# Patient Record
(demographics unavailable — no encounter records)

---

## 2025-01-30 NOTE — DATA REVIEWED
[FreeTextEntry1] : Independent review of imaging and independent interpretation was performed at today's visit. -1/18/25 CT Abdomen and Pelvis at [facility] -1/19/25 EGD at [facility]

## 2025-01-30 NOTE — HISTORY OF PRESENT ILLNESS
[FreeTextEntry1] : Ms. HERNANDEZ is a 75-year-old female who presents for follow up regarding GERD, Hiatal hernia.    Past Medical History is significant for HTN, DM, Neuropathy, Asthma, HLD,   1/19/25-1/24/25 [facility] Admission -Patient initially presented to ER with complaints of epigastric pain diffuse constant abdominal pain associated with nausea and vomiting. She underwent a CT abdomen and pelvis which revealed dilation of hiatal hernia with debris to the level of the aortic hiatus. The esophagus proximal to this is fluid-filled. There is high density return to the gallbladder compatible with vicarious excretion of contrast. She has history of Hiatal hernia/GERD s/p hiatal hernia repair (03/2021 in [place]) s/p redo hiatal hernia repair (10/2022) with Dr. John Ferrer followed by balloon dilation of lower esophageal sphincter 2/27/23. She was admitted to CTICU, electively intubated and underwent EGD which demonstrated large food burden proximal to wrap. She also underwent ERCP with evidence of chronic stasis and large hernia sac, which was approximately 7cm in diameter filled with food debris, impacted, obstructing the gastric outlet. She was ultimately extubated without issue. Transferred to floor, tolerated sips of water and ice chips and advanced to full liquids on 1/23. Patient was discharged home 1/24 with outpatient follow up.   1/19/25 EGD at [facility] -There was evidence in the mid-distal esophagus consistent with chronic stasis, there was a large hernia sac which was approximately 7 cm in diameter filled with food debris, impacted, obstructing the outlet to the gastric lumen, utilizing multiple devices enough of the material was carefully removed in order to advance the endoscope into the lower portion of the stomach. Using a TTS balloon dilator the fundoplication was dilated serially to a maximal diameter of 1.5 cm with minimal superficial mucosal tearing, despite attempts the retained material could not be entirely removed. The decision was made to place an NGT tube, which was confirmed endoscopically to be within the hernia sac and possibly beyond, could not visualize whether NGT enters into stomach or looped within the hernia sac.  1/18/25 CT Abdomen and Pelvis at [facility] -Small to moderate sized hiatal hernia, which is distended with enteric debris to the level of the diaphragmatic hiatus.  -Abnormal endometrial thickening measuring up to 1.1cm in AP thickness -Questionable subtle 8mm mildly hyper enhancing lesion within the trial of the pancreas.

## 2025-02-13 NOTE — ASSESSMENT
[FreeTextEntry1] : Ms. HERNANDEZ is a 75-year-old female who presents for follow up regarding GERD, Hiatal hernia.    Past Medical History is significant for HTN, DM, Neuropathy, Asthma, HLD,   1/19/25-1/24/25 [facility] Admission -Patient initially presented to ER with complaints of epigastric pain diffuse constant abdominal pain associated with nausea and vomiting. She underwent a CT abdomen and pelvis which revealed dilation of hiatal hernia with debris to the level of the aortic hiatus. The esophagus proximal to this is fluid-filled. There is high density return to the gallbladder compatible with vicarious excretion of contrast. She has history of Hiatal hernia/GERD s/p hiatal hernia repair (03/2021 in [place]) s/p redo hiatal hernia repair (10/2022) with Dr. John Ferrer followed by balloon dilation of lower esophageal sphincter 2/27/23. She was admitted to CTICU, electively intubated and underwent EGD which demonstrated large food burden proximal to wrap. She also underwent ERCP with evidence of chronic stasis and large hernia sac, which was approximately 7cm in diameter filled with food debris, impacted, obstructing the gastric outlet. She was ultimately extubated without issue. Transferred to floor, tolerated sips of water and ice chips and advanced to full liquids on 1/23. Patient was discharged home 1/24 with outpatient follow up.   1/18/25 CT Abdomen and Pelvis at [facility] -Small to moderate sized hiatal hernia, which is distended with enteric debris to the level of the diaphragmatic hiatus.  -Abnormal endometrial thickening measuring up to 1.1cm in AP thickness -Questionable subtle 8mm mildly hyper enhancing lesion within the trial of the pancreas.   1/19/25 EGD at [facility] -There was evidence in the mid-distal esophagus consistent with chronic stasis, there was a large hernia sac which was approximately 7 cm in diameter filled with food debris, impacted, obstructing the outlet to the gastric lumen, utilizing multiple devices enough of the material was carefully removed in order to advance the endoscope into the lower portion of the stomach. Using a TTS balloon dilator the fundoplication was dilated serially to a maximal diameter of 1.5 cm with minimal superficial mucosal tearing, despite attempts the retained material could not be entirely removed. The decision was made to place an NGT tube, which was confirmed endoscopically to be within the hernia sac and possibly beyond, could not visualize whether NGT enters into stomach or looped within the hernia sac.  We discussed that she is not experiencing any concerning symptoms at this time including dysphagia, vomiting or abdominal discomfort. She is tolerating a regular diet. We discussed that she should continue a regular diet, to eat slow and chew her food well. I would like to closely monitor her symptoms; therefore, I am recommending she follow up in office for clinical evaluation in three months. All questions answered, patient verbalizes understanding and agrees to follow up accordingly.  PLAN:  -Return to office for clinical evaluation in 3 months or sooner if needed  I, Dr. Belcher personally performed the evaluation and management (E/M) services for this established patient who presents today with an existing condition(s). That E/M includes conducting the examination, assessing all new/exacerbated conditions, and establishing a new plan of care. Today, my ACP, Dot Dai NP, was here to observe my evaluation and management services for this existing condition to be followed going forward.

## 2025-02-13 NOTE — ASSESSMENT
[FreeTextEntry1] : Ms. HERNANDEZ is a 75-year-old female who presents for follow up regarding GERD, Hiatal hernia.    Past Medical History is significant for HTN, DM, Neuropathy, Asthma, HLD,   1/19/25-1/24/25 [facility] Admission -Patient initially presented to ER with complaints of epigastric pain diffuse constant abdominal pain associated with nausea and vomiting. She underwent a CT abdomen and pelvis which revealed dilation of hiatal hernia with debris to the level of the aortic hiatus. The esophagus proximal to this is fluid-filled. There is high density return to the gallbladder compatible with vicarious excretion of contrast. She has history of Hiatal hernia/GERD s/p hiatal hernia repair (03/2021 in [place]) s/p redo hiatal hernia repair (10/2022) with Dr. John Ferrer followed by balloon dilation of lower esophageal sphincter 2/27/23. She was admitted to CTICU, electively intubated and underwent EGD which demonstrated large food burden proximal to wrap. She also underwent ERCP with evidence of chronic stasis and large hernia sac, which was approximately 7cm in diameter filled with food debris, impacted, obstructing the gastric outlet. She was ultimately extubated without issue. Transferred to floor, tolerated sips of water and ice chips and advanced to full liquids on 1/23. Patient was discharged home 1/24 with outpatient follow up.   1/18/25 CT Abdomen and Pelvis at [facility] -Small to moderate sized hiatal hernia, which is distended with enteric debris to the level of the diaphragmatic hiatus.  -Abnormal endometrial thickening measuring up to 1.1cm in AP thickness -Questionable subtle 8mm mildly hyper enhancing lesion within the trial of the pancreas.   1/19/25 EGD at [facility] -There was evidence in the mid-distal esophagus consistent with chronic stasis, there was a large hernia sac which was approximately 7 cm in diameter filled with food debris, impacted, obstructing the outlet to the gastric lumen, utilizing multiple devices enough of the material was carefully removed in order to advance the endoscope into the lower portion of the stomach. Using a TTS balloon dilator the fundoplication was dilated serially to a maximal diameter of 1.5 cm with minimal superficial mucosal tearing, despite attempts the retained material could not be entirely removed. The decision was made to place an NGT tube, which was confirmed endoscopically to be within the hernia sac and possibly beyond, could not visualize whether NGT enters into stomach or looped within the hernia sac.  We discussed that she is not experiencing any concerning symptoms at this time including dysphagia, vomiting or abdominal discomfort. She is tolerating a regular diet. We discussed that she should continue a regular diet, to eat slow and chew her food well. I would like to closely monitor her symptoms; therefore, I am recommending she follow up in office for clinical evaluation in three months. All questions answered, patient verbalizes understanding and agrees to follow up accordingly.  PLAN:  -Return to office for clinical evaluation in 3 months or sooner if needed  I, Dr. Belcher personally performed the evaluation and management (E/M) services for this established patient who presents today with an existing condition(s). That E/M includes conducting the examination, assessing all new/exacerbated conditions, and establishing a new plan of care. Today, my ACP, Dot Dai NP, was here to observe my evaluation and management services for this existing condition to be followed going forward.

## 2025-02-13 NOTE — PHYSICAL EXAM
[Sclera] : the sclera and conjunctiva were normal [Neck Appearance] : the appearance of the neck was normal [Respiration, Rhythm And Depth] : normal respiratory rhythm and effort [Auscultation Breath Sounds / Voice Sounds] : lungs were clear to auscultation bilaterally [Heart Rate And Rhythm] : heart rate was normal and rhythm regular [Heart Sounds] : normal S1 and S2 [Examination Of The Chest] : the chest was normal in appearance [Bowel Sounds] : normal bowel sounds [Abdomen Tenderness] : non-tender [Abnormal Walk] : normal gait [Skin Color & Pigmentation] : normal skin color and pigmentation [No Focal Deficits] : no focal deficits [Oriented To Time, Place, And Person] : oriented to person, place, and time [Impaired Insight] : insight and judgment were intact

## 2025-02-13 NOTE — DATA REVIEWED
[FreeTextEntry1] : Independent review of imaging and independent interpretation was performed at today's visit. -1/18/25 CT Abdomen and Pelvis at [facility] -1/19/25 EGD at [facility]

## 2025-02-13 NOTE — REVIEW OF SYSTEMS
[Negative] : Heme/Lymph [Fever] : no fever [Chills] : no chills [Feeling Poorly] : not feeling poorly [Feeling Tired] : not feeling tired [Chest Pain] : no chest pain [Shortness Of Breath] : no shortness of breath [Cough] : no cough [SOB on Exertion] : no shortness of breath during exertion [FreeTextEntry7] : intermittent nausea

## 2025-02-13 NOTE — HISTORY OF PRESENT ILLNESS
[FreeTextEntry1] : Ms. HERNANDEZ is a 75-year-old female who presents for follow up regarding GERD, Hiatal hernia.    Past Medical History is significant for HTN, DM, Neuropathy, Asthma, HLD,   1/19/25-1/24/25 [facility] Admission -Patient initially presented to ER with complaints of epigastric pain diffuse constant abdominal pain associated with nausea and vomiting. She underwent a CT abdomen and pelvis which revealed dilation of hiatal hernia with debris to the level of the aortic hiatus. The esophagus proximal to this is fluid-filled. There is high density return to the gallbladder compatible with vicarious excretion of contrast. She has history of Hiatal hernia/GERD s/p hiatal hernia repair (03/2021 in [place]) s/p redo hiatal hernia repair (10/2022) with Dr. John Ferrer followed by balloon dilation of lower esophageal sphincter 2/27/23. She was admitted to CTICU, electively intubated and underwent EGD which demonstrated large food burden proximal to wrap. She also underwent ERCP with evidence of chronic stasis and large hernia sac, which was approximately 7cm in diameter filled with food debris, impacted, obstructing the gastric outlet. She was ultimately extubated without issue. Transferred to floor, tolerated sips of water and ice chips and advanced to full liquids on 1/23. Patient was discharged home 1/24 with outpatient follow up.   1/18/25 CT Abdomen and Pelvis at [facility] -Small to moderate sized hiatal hernia, which is distended with enteric debris to the level of the diaphragmatic hiatus.  -Abnormal endometrial thickening measuring up to 1.1cm in AP thickness -Questionable subtle 8mm mildly hyper enhancing lesion within the trial of the pancreas.   1/19/25 EGD at [facility] -There was evidence in the mid-distal esophagus consistent with chronic stasis, there was a large hernia sac which was approximately 7 cm in diameter filled with food debris, impacted, obstructing the outlet to the gastric lumen, utilizing multiple devices enough of the material was carefully removed in order to advance the endoscope into the lower portion of the stomach. Using a TTS balloon dilator the fundoplication was dilated serially to a maximal diameter of 1.5 cm with minimal superficial mucosal tearing, despite attempts the retained material could not be entirely removed. The decision was made to place an NGT tube, which was confirmed endoscopically to be within the hernia sac and possibly beyond, could not visualize whether NGT enters into stomach or looped within the hernia sac.  Today, she is accompanied by her daughter who assists in the history taking. She reports feeling overall well today. She reports mild intermittent nausea.  She is eating well, denies dysphagia or vomiting.

## 2025-02-13 NOTE — DATA REVIEWED
[FreeTextEntry1] : Independent review of imaging and independent interpretation was performed at today's visit. -1/18/25 CT Abdomen and Pelvis at [facility] -1/19/25 EGD at [facility]

## 2025-02-13 NOTE — HISTORY OF PRESENT ILLNESS
[FreeTextEntry1] : Ms. HERNANDEZ is a 75-year-old female who presents for follow up regarding GERD, Hiatal hernia.    Past Medical History is significant for HTN, DM, Neuropathy, Asthma, HLD,   1/19/25-1/24/25 [facility] Admission -Patient initially presented to ER with complaints of epigastric pain diffuse constant abdominal pain associated with nausea and vomiting. She underwent a CT abdomen and pelvis which revealed dilation of hiatal hernia with debris to the level of the aortic hiatus. The esophagus proximal to this is fluid-filled. There is high density return to the gallbladder compatible with vicarious excretion of contrast. She has history of Hiatal hernia/GERD s/p hiatal hernia repair (03/2021 in [place]) s/p redo hiatal hernia repair (10/2022) with Dr. John Ferrer followed by balloon dilation of lower esophageal sphincter 2/27/23. She was admitted to CTICU, electively intubated and underwent EGD which demonstrated large food burden proximal to wrap. She also underwent ERCP with evidence of chronic stasis and large hernia sac, which was approximately 7cm in diameter filled with food debris, impacted, obstructing the gastric outlet. She was ultimately extubated without issue. Transferred to floor, tolerated sips of water and ice chips and advanced to full liquids on 1/23. Patient was discharged home 1/24 with outpatient follow up.   1/18/25 CT Abdomen and Pelvis at [facility] -Small to moderate sized hiatal hernia, which is distended with enteric debris to the level of the diaphragmatic hiatus.  -Abnormal endometrial thickening measuring up to 1.1cm in AP thickness -Questionable subtle 8mm mildly hyper enhancing lesion within the trial of the pancreas.   1/19/25 EGD at [facility] -There was evidence in the mid-distal esophagus consistent with chronic stasis, there was a large hernia sac which was approximately 7 cm in diameter filled with food debris, impacted, obstructing the outlet to the gastric lumen, utilizing multiple devices enough of the material was carefully removed in order to advance the endoscope into the lower portion of the stomach. Using a TTS balloon dilator the fundoplication was dilated serially to a maximal diameter of 1.5 cm with minimal superficial mucosal tearing, despite attempts the retained material could not be entirely removed. The decision was made to place an NGT tube, which was confirmed endoscopically to be within the hernia sac and possibly beyond, could not visualize whether NGT enters into stomach or looped within the hernia sac.  Today, she is accompanied by her daughter who assists in the history taking. She reports feeling overall well today. She reports mild intermittent nausea.  She is eating well, denies dysphagia or vomiting.

## 2025-04-29 NOTE — HISTORY OF PRESENT ILLNESS
[FreeTextEntry1] : Ms. HERNANDEZ is a 75-year-old female who presents for follow up regarding GERD, Hiatal hernia. At our last visit on 2/13/25, it is recommended she return to office for clinical evaluation in 3 months.   Past Medical History is significant for HTN, DM, Neuropathy, Asthma, HLD,  1/19/25-1/24/25 [facility] Admission -Patient initially presented to ER with complaints of epigastric pain diffuse constant abdominal pain associated with nausea and vomiting. She underwent a CT abdomen and pelvis which revealed dilation of hiatal hernia with debris to the level of the aortic hiatus. The esophagus proximal to this is fluid-filled. There is high density return to the gallbladder compatible with vicarious excretion of contrast. She has history of Hiatal hernia/GERD s/p hiatal hernia repair (03/2021 in [place]) s/p redo hiatal hernia repair (10/2022) with Dr. John Ferrer followed by balloon dilation of lower esophageal sphincter 2/27/23. She was admitted to CTICU, electively intubated and underwent EGD which demonstrated large food burden proximal to wrap. She also underwent ERCP with evidence of chronic stasis and large hernia sac, which was approximately 7cm in diameter filled with food debris, impacted, obstructing the gastric outlet. She was ultimately extubated without issue. Transferred to floor, tolerated sips of water and ice chips and advanced to full liquids on 1/23. Patient was discharged home 1/24 with outpatient follow up.  1/18/25 CT Abdomen and Pelvis at [facility] -Small to moderate sized hiatal hernia, which is distended with enteric debris to the level of the diaphragmatic hiatus. -Abnormal endometrial thickening measuring up to 1.1cm in AP thickness -Questionable subtle 8mm mildly hyper enhancing lesion within the trial of the pancreas.  1/19/25 EGD at [facility] -There was evidence in the mid-distal esophagus consistent with chronic stasis, there was a large hernia sac which was approximately 7 cm in diameter filled with food debris, impacted, obstructing the outlet to the gastric lumen, utilizing multiple devices enough of the material was carefully removed in order to advance the endoscope into the lower portion of the stomach. Using a TTS balloon dilator the fundoplication was dilated serially to a maximal diameter of 1.5 cm with minimal superficial mucosal tearing, despite attempts the retained material could not be entirely removed. The decision was made to place an NGT tube, which was confirmed endoscopically to be within the hernia sac and possibly beyond, could not visualize whether NGT enters into stomach or looped within the hernia sac  2/13/25 Office visit: Recommended clinical evaluation in 3 months

## 2025-04-29 NOTE — ASSESSMENT
[FreeTextEntry1] : Ms. HERNANDEZ is a 75-year-old female who presents for follow up regarding GERD, Hiatal hernia. At our last visit on 2/13/25, it is recommended she return to office for clinical evaluation in 3 months.   Past Medical History is significant for HTN, DM, Neuropathy, Asthma, HLD  2/13/25 Office visit: Patient was feeling well, no complaints of dysphagia, vomiting or abdominal discomfort. Recommended to continue regular diet as tolerated and to return to office for clinical evaluation in 3 months   I have reviewed the patient's medical records and diagnostic images at time of this consultation and have made the following recommendations: 1. 2.  All questions answered, patient verbalizes understanding and agrees to follow up accordingly.

## 2025-05-01 NOTE — HISTORY OF PRESENT ILLNESS
[FreeTextEntry1] : Ms. HERNANDEZ is a 75-year-old female who presents for follow up regarding GERD, Hiatal hernia. At our last visit on 2/13/25, it is recommended she return to office for clinical evaluation in 3 months.   Past Medical History is significant for HTN, DM, Neuropathy, Asthma, HLD,  1/19/25-1/24/25 [facility] Admission -Patient initially presented to ER with complaints of epigastric pain diffuse constant abdominal pain associated with nausea and vomiting. She underwent a CT abdomen and pelvis which revealed dilation of hiatal hernia with debris to the level of the aortic hiatus. The esophagus proximal to this is fluid-filled. There is high density return to the gallbladder compatible with vicarious excretion of contrast. She has history of Hiatal hernia/GERD s/p hiatal hernia repair (03/2021 in [place]) s/p redo hiatal hernia repair (10/2022) with Dr. John Ferrer followed by balloon dilation of lower esophageal sphincter 2/27/23. She was admitted to CTICU, electively intubated and underwent EGD which demonstrated large food burden proximal to wrap. She also underwent ERCP with evidence of chronic stasis and large hernia sac, which was approximately 7cm in diameter filled with food debris, impacted, obstructing the gastric outlet. She was ultimately extubated without issue. Transferred to floor, tolerated sips of water and ice chips and advanced to full liquids on 1/23. Patient was discharged home 1/24 with outpatient follow up.  1/18/25 CT Abdomen and Pelvis at [facility] -Small to moderate sized hiatal hernia, which is distended with enteric debris to the level of the diaphragmatic hiatus. -Abnormal endometrial thickening measuring up to 1.1cm in AP thickness -Questionable subtle 8mm mildly hyper enhancing lesion within the trial of the pancreas.  1/19/25 EGD at [facility] -There was evidence in the mid-distal esophagus consistent with chronic stasis, there was a large hernia sac which was approximately 7 cm in diameter filled with food debris, impacted, obstructing the outlet to the gastric lumen, utilizing multiple devices enough of the material was carefully removed in order to advance the endoscope into the lower portion of the stomach. Using a TTS balloon dilator the fundoplication was dilated serially to a maximal diameter of 1.5 cm with minimal superficial mucosal tearing, despite attempts the retained material could not be entirely removed. The decision was made to place an NGT tube, which was confirmed endoscopically to be within the hernia sac and possibly beyond, could not visualize whether NGT enters into stomach or looped within the hernia sac  2/13/25 Office visit: Recommended clinical evaluation in 3 months

## 2025-06-13 NOTE — ASSESSMENT
[FreeTextEntry1] : Ms. ATKINS is a 75-year-old female who presents for follow up regarding GERD, Hiatal hernia. She has history of Hiatal hernia/GERD s/p hiatal hernia repair (03/2021 in [place]) s/p redo hiatal hernia repair (10/2022) with Dr. John Ferrer followed by balloon dilation of lower esophageal sphincter 2/27/23. She was admitted to CTICU, electively intubated and underwent EGD which demonstrated large food burden proximal to wrap. She also underwent ERCP with evidence of chronic stasis and large hernia sac, which was approximately 7cm in diameter filled with food debris, impacted, obstructing the gastric outlet in January 2025. At our last visit on 2/13/25, it is recommended she return to office for clinical evaluation in 3 months.   Past Medical History is significant for HTN, DM, Neuropathy, Asthma, HLD.  2/13/25 Office visit: Patient was feeling well, no complaints of dysphagia, vomiting or abdominal discomfort. Recommended to continue regular diet as tolerated and to return to office for clinical evaluation in 3 months   Overall, Ms. Atkins is doing well. I am encouraged by her progress. No further intervention is warranted at this time. The patient may return to care as needed.  I have reviewed the patient's medical records and diagnostic images at time of this consultation and have made the following recommendations: 1. Return to care as needed.  All questions answered, patient verbalizes understanding and agrees to follow up accordingly.  I, Dr. Marko Belcher, personally performed the evaluation and management (E/M) services for this established patient who presents today with an existing condition.  That E/M includes conducting the examination, assessing all conditions, and establishing a new plan of care.  Today, Dawit Sheehan PA-C, was here to observe my evaluation and management services for this/these condition(s) to be followed going forward.

## 2025-06-13 NOTE — HISTORY OF PRESENT ILLNESS
[FreeTextEntry1] : Ms. HERNANDEZ is a 75-year-old female who presents for follow up regarding GERD, Hiatal hernia. At our last visit on 2/13/25, it is recommended she return to office for clinical evaluation in 3 months.   Past Medical History is significant for HTN, DM, Neuropathy, Asthma, HLD,  1/19/25-1/24/25 [facility] Admission -Patient initially presented to ER with complaints of epigastric pain diffuse constant abdominal pain associated with nausea and vomiting. She underwent a CT abdomen and pelvis which revealed dilation of hiatal hernia with debris to the level of the aortic hiatus. The esophagus proximal to this is fluid-filled. There is high density return to the gallbladder compatible with vicarious excretion of contrast. She has history of Hiatal hernia/GERD s/p hiatal hernia repair (03/2021 in [place]) s/p redo hiatal hernia repair (10/2022) with Dr. John Ferrer followed by balloon dilation of lower esophageal sphincter 2/27/23. She was admitted to CTICU, electively intubated and underwent EGD which demonstrated large food burden proximal to wrap. She also underwent ERCP with evidence of chronic stasis and large hernia sac, which was approximately 7cm in diameter filled with food debris, impacted, obstructing the gastric outlet. She was ultimately extubated without issue. Transferred to floor, tolerated sips of water and ice chips and advanced to full liquids on 1/23. Patient was discharged home 1/24 with outpatient follow up.  1/18/25 CT Abdomen and Pelvis at [facility] -Small to moderate sized hiatal hernia, which is distended with enteric debris to the level of the diaphragmatic hiatus. -Abnormal endometrial thickening measuring up to 1.1cm in AP thickness -Questionable subtle 8mm mildly hyper enhancing lesion within the trial of the pancreas.  1/19/25 EGD at [facility] -There was evidence in the mid-distal esophagus consistent with chronic stasis, there was a large hernia sac which was approximately 7 cm in diameter filled with food debris, impacted, obstructing the outlet to the gastric lumen, utilizing multiple devices enough of the material was carefully removed in order to advance the endoscope into the lower portion of the stomach. Using a TTS balloon dilator the fundoplication was dilated serially to a maximal diameter of 1.5 cm with minimal superficial mucosal tearing, despite attempts the retained material could not be entirely removed. The decision was made to place an NGT tube, which was confirmed endoscopically to be within the hernia sac and possibly beyond, could not visualize whether NGT enters into stomach or looped within the hernia sac  2/13/25 Office visit: Recommended clinical evaluation in 3 months   Today she reports feeling well. Patient denies chest pain, palpitations, shortness of breath, cough, fevers, chills, fatigue and unintentional weight loss or gain.

## 2025-06-13 NOTE — HISTORY OF PRESENT ILLNESS
[FreeTextEntry1] : Ms. HERNANDEZ is a 75-year-old female who presents for follow up regarding GERD, Hiatal hernia. At our last visit on 2/13/25, it is recommended she return to office for clinical evaluation in 3 months.   Past Medical History is significant for HTN, DM, Neuropathy, Asthma, HLD,  1/19/25-1/24/25 [facility] Admission -Patient initially presented to ER with complaints of epigastric pain diffuse constant abdominal pain associated with nausea and vomiting. She underwent a CT abdomen and pelvis which revealed dilation of hiatal hernia with debris to the level of the aortic hiatus. The esophagus proximal to this is fluid-filled. There is high density return to the gallbladder compatible with vicarious excretion of contrast. She has history of Hiatal hernia/GERD s/p hiatal hernia repair (03/2021 in [place]) s/p redo hiatal hernia repair (10/2022) with Dr. John Ferrer followed by balloon dilation of lower esophageal sphincter 2/27/23. She was admitted to CTICU, electively intubated and underwent EGD which demonstrated large food burden proximal to wrap. She also underwent ERCP with evidence of chronic stasis and large hernia sac, which was approximately 7cm in diameter filled with food debris, impacted, obstructing the gastric outlet. She was ultimately extubated without issue. Transferred to floor, tolerated sips of water and ice chips and advanced to full liquids on 1/23. Patient was discharged home 1/24 with outpatient follow up.  1/18/25 CT Abdomen and Pelvis at [facility] -Small to moderate sized hiatal hernia, which is distended with enteric debris to the level of the diaphragmatic hiatus. -Abnormal endometrial thickening measuring up to 1.1cm in AP thickness -Questionable subtle 8mm mildly hyper enhancing lesion within the trial of the pancreas.  1/19/25 EGD at [facility] -There was evidence in the mid-distal esophagus consistent with chronic stasis, there was a large hernia sac which was approximately 7 cm in diameter filled with food debris, impacted, obstructing the outlet to the gastric lumen, utilizing multiple devices enough of the material was carefully removed in order to advance the endoscope into the lower portion of the stomach. Using a TTS balloon dilator the fundoplication was dilated serially to a maximal diameter of 1.5 cm with minimal superficial mucosal tearing, despite attempts the retained material could not be entirely removed. The decision was made to place an NGT tube, which was confirmed endoscopically to be within the hernia sac and possibly beyond, could not visualize whether NGT enters into stomach or looped within the hernia sac  2/13/25 Office visit: Recommended clinical evaluation in 3 months   Today she reports feeling well. Patient denies chest pain, palpitations, shortness of breath, cough, fevers, chills, fatigue and unintentional weight loss or gain.

## 2025-06-13 NOTE — HISTORY OF PRESENT ILLNESS
[FreeTextEntry1] : Ms. HERNANDEZ is a 75-year-old female who presents for follow up regarding GERD, Hiatal hernia. At our last visit on 2/13/25, it is recommended she return to office for clinical evaluation in 3 months.   Past Medical History is significant for HTN, DM, Neuropathy, Asthma, HLD,  1/19/25-1/24/25 [facility] Admission -Patient initially presented to ER with complaints of epigastric pain diffuse constant abdominal pain associated with nausea and vomiting. She underwent a CT abdomen and pelvis which revealed dilation of hiatal hernia with debris to the level of the aortic hiatus. The esophagus proximal to this is fluid-filled. There is high density return to the gallbladder compatible with vicarious excretion of contrast. She has history of Hiatal hernia/GERD s/p hiatal hernia repair (03/2021 in [place]) s/p redo hiatal hernia repair (10/2022) with Dr. John Ferrer followed by balloon dilation of lower esophageal sphincter 2/27/23. She was admitted to CTICU, electively intubated and underwent EGD which demonstrated large food burden proximal to wrap. She also underwent ERCP with evidence of chronic stasis and large hernia sac, which was approximately 7cm in diameter filled with food debris, impacted, obstructing the gastric outlet. She was ultimately extubated without issue. Transferred to floor, tolerated sips of water and ice chips and advanced to full liquids on 1/23. Patient was discharged home 1/24 with outpatient follow up.  1/18/25 CT Abdomen and Pelvis at [facility] -Small to moderate sized hiatal hernia, which is distended with enteric debris to the level of the diaphragmatic hiatus. -Abnormal endometrial thickening measuring up to 1.1cm in AP thickness -Questionable subtle 8mm mildly hyper enhancing lesion within the trial of the pancreas.  1/19/25 EGD at [facility] -There was evidence in the mid-distal esophagus consistent with chronic stasis, there was a large hernia sac which was approximately 7 cm in diameter filled with food debris, impacted, obstructing the outlet to the gastric lumen, utilizing multiple devices enough of the material was carefully removed in order to advance the endoscope into the lower portion of the stomach. Using a TTS balloon dilator the fundoplication was dilated serially to a maximal diameter of 1.5 cm with minimal superficial mucosal tearing, despite attempts the retained material could not be entirely removed. The decision was made to place an NGT tube, which was confirmed endoscopically to be within the hernia sac and possibly beyond, could not visualize whether NGT enters into stomach or looped within the hernia sac  2/13/25 Office visit: Recommended clinical evaluation in 3 months   Today she reports feeling well. Patient denies chest pain, palpitations, shortness of breath, cough, fevers, chills, fatigue and unintentional weight loss or gain.

## 2025-06-13 NOTE — PHYSICAL EXAM
[Sclera] : the sclera and conjunctiva were normal [Neck Appearance] : the appearance of the neck was normal [Respiration, Rhythm And Depth] : normal respiratory rhythm and effort [Auscultation Breath Sounds / Voice Sounds] : lungs were clear to auscultation bilaterally [Heart Rate And Rhythm] : heart rate was normal and rhythm regular [Heart Sounds] : normal S1 and S2 [Bowel Sounds] : normal bowel sounds [FreeTextEntry2] : no edema

## 2025-07-17 NOTE — DISCUSSION/SUMMARY
[FreeTextEntry1] : Assessment is normal GYN exam with atrophic vaginitis and possible thickened endometrium.  Explained need for pelvic sonogram followed by possible need for endometrial sampling depending on findings.  All questions answered.  Medical assistant was present in the room during entire visit.

## 2025-07-17 NOTE — HISTORY OF PRESENT ILLNESS
[Patient reported mammogram was normal] : Patient reported mammogram was normal [Patient reported breast sonogram was normal] : Patient reported breast sonogram was normal [Patient reported colonoscopy was normal] : Patient reported colonoscopy was normal [LMP unknown] : LMP unknown [postmenopausal] : postmenopausal [N] : Patient does not use contraception [Y] : Positive pregnancy history [unknown] : Patient is unsure of the date of her LMP [Menopause Age: ____] : age at menopause was [unfilled] [No] : Patient does not have concerns regarding sex [Mammogramdate] : 2024 [BreastSonogramDate] : 2024 [PapSmeardate] : <2 YRS [ColonoscopyDate] : <5YRS [PGxTotal] : 7 [[facility] : [facility] [[facility]Living] : 6 [PGHxABInduced] : 1 [FreeTextEntry1] :  X5 C/S X1

## 2025-07-17 NOTE — PHYSICAL EXAM
[MA] : MA [Appropriately responsive] : appropriately responsive [Alert] : alert [No Acute Distress] : no acute distress [No Lymphadenopathy] : no lymphadenopathy [Regular Rate Rhythm] : regular rate rhythm [No Murmurs] : no murmurs [Clear to Auscultation B/L] : clear to auscultation bilaterally [Soft] : soft [Non-tender] : non-tender [Non-distended] : non-distended [No HSM] : No HSM [No Lesions] : no lesions [No Mass] : no mass [Oriented x3] : oriented x3 [Examination Of The Breasts] : a normal appearance [No Masses] : no breast masses were palpable [Labia Majora] : normal [Labia Minora] : normal [Atrophy] : atrophy [No Bleeding] : There was no active vaginal bleeding [Normal] : normal [Retroversion] : retroverted [Uterine Adnexae] : non-palpable [FreeTextEntry2] : BETSY TILLMAN